# Patient Record
Sex: FEMALE | Race: WHITE
[De-identification: names, ages, dates, MRNs, and addresses within clinical notes are randomized per-mention and may not be internally consistent; named-entity substitution may affect disease eponyms.]

---

## 2018-12-06 ENCOUNTER — HOSPITAL ENCOUNTER (OUTPATIENT)
Dept: HOSPITAL 59 - HOP | Age: 63
Discharge: HOME | End: 2018-12-06
Attending: INTERNAL MEDICINE
Payer: COMMERCIAL

## 2018-12-06 DIAGNOSIS — Z12.11: Primary | ICD-10-CM

## 2018-12-06 DIAGNOSIS — Z86.010: ICD-10-CM

## 2018-12-06 PROCEDURE — 00812 ANES LWR INTST SCR COLSC: CPT

## 2018-12-07 NOTE — OPERATIVE NOTE
DATE OF SURGERY: 12/06/18



OPERATION: COLONOSCOPY. 



PREOPERATIVE DIAGNOSIS: Personal history of colon polyps. 



POSTOPERATIVE DIAGNOSIS: Normal exam. 



PREPARATION QUALITY: Good to excellent. 



ESTIMATED BLOOD LOSS: None. 



SPECIMENS: None. 



COMPLICATIONS: None apparent. 



PROCEDURE: After informed consent was obtained from the patient, she was placed in the left lateral 
decubitus position in the endoscopy suite, sedated and monitored by the department of anesthesia. 
Digital rectal exam was unremarkable. A well-lubricated [id number] colonoscope was inserted into the 
rectum and advanced to the cecum. The ileocecal valve, appendiceal orifice, cecum, ascending colon, 
transverse colon, descending colon, sigmoid colon, and rectum were unremarkable. J-turn views of the 
anorectum were unrevealing. The endoscope was straightened, the rectal ampulla deflated, and the 
endoscope was removed. 



RECOMMENDATIONS: The patient should follow up with Dr. Álvarez. Of concern at this point would be 
the fact that she is not taking any medication. Apparently has had an MI in the past. She did have 
some hypertension that was evident during and even despite the sedation. We will defer to Dr. Álvarez for continued medical input and perhaps even patient may require cardiology followup. She 
should undergo repeat colonoscopy in 5 years. 



As always, thank you for allowing me to participate in the healthcare of your patients. CC: MD MANINDER Nickerson